# Patient Record
Sex: MALE | Race: WHITE
[De-identification: names, ages, dates, MRNs, and addresses within clinical notes are randomized per-mention and may not be internally consistent; named-entity substitution may affect disease eponyms.]

---

## 2022-11-09 ENCOUNTER — HOSPITAL ENCOUNTER (OUTPATIENT)
Dept: HOSPITAL 47 - ORWHC2ENDO | Age: 62
Discharge: HOME | End: 2022-11-09
Attending: INTERNAL MEDICINE
Payer: COMMERCIAL

## 2022-11-09 VITALS — BODY MASS INDEX: 33.6 KG/M2

## 2022-11-09 VITALS — TEMPERATURE: 96.7 F | RESPIRATION RATE: 16 BRPM

## 2022-11-09 VITALS — SYSTOLIC BLOOD PRESSURE: 133 MMHG | HEART RATE: 82 BPM | DIASTOLIC BLOOD PRESSURE: 83 MMHG

## 2022-11-09 DIAGNOSIS — K63.5: ICD-10-CM

## 2022-11-09 DIAGNOSIS — D12.4: ICD-10-CM

## 2022-11-09 DIAGNOSIS — E11.9: ICD-10-CM

## 2022-11-09 DIAGNOSIS — E78.5: ICD-10-CM

## 2022-11-09 DIAGNOSIS — I10: ICD-10-CM

## 2022-11-09 DIAGNOSIS — Z12.11: Primary | ICD-10-CM

## 2022-11-09 DIAGNOSIS — E66.9: ICD-10-CM

## 2022-11-09 LAB — GLUCOSE BLD-MCNC: 176 MG/DL (ref 70–110)

## 2022-11-09 PROCEDURE — 45385 COLONOSCOPY W/LESION REMOVAL: CPT

## 2022-11-09 PROCEDURE — 88305 TISSUE EXAM BY PATHOLOGIST: CPT

## 2022-11-09 NOTE — P.PCN
Date of Procedure: 11/09/22


Procedure(s) Performed: 


BRIEF HISTORY: Patient is a 62-year-old pleasant white male scheduled for an 

elective colonoscopy as a part of screening for colorectal neoplasia.  





PROCEDURE PERFORMED: Colonoscopy snare polypectomy. 





PREOPERATIVE DIAGNOSIS: Screening for colon cancer. 





IV sedation per Anesthesia. 





PROCEDURE: After informed consent was obtained, the patient, was brought into 

the endoscopy unit. IV sedation was administered by Anesthesia under continuous 

monitoring.  Digital rectal examination was normal. Initially the Olympus CF-160

flexible video colonoscope was then inserted in the rectum, gradually advanced 

into the cecum without any difficulty. Careful examination was performed as the 

scope was gradually being withdrawn. Ileocecal valve and the appendiceal orifice

were visualized and appeared normal.  Prep was excellent. Mucosa of the cecum, 

ascending colon, transverse colon, appeared normal.  In the descending colon 

there was a 5 mm polyp removed by snare polypectomy.  In the sigmoid: There was 

a 5-6 mm sessile polyp removed by snare polypectomy.  Rest of the descending 

colon, sigmoid colon, and rectum appeared normal. Retroflexion was performed in 

the rectum and no lesions were seen. The patient tolerated the procedure well. 





IMPRESSION: 


5 mm descending colon polyp status post polypectomy


5-6 mm sigmoid colon polyp status post





RECOMMENDATIONS:  Findings of this examination were discussed with the patient 

as his family.  He was advised to follow with the biopsy results.  If the biopsy

results adenoma he can have a repeat colonoscopy in 5 years..